# Patient Record
Sex: MALE | Race: WHITE | NOT HISPANIC OR LATINO | Employment: FULL TIME | URBAN - METROPOLITAN AREA
[De-identification: names, ages, dates, MRNs, and addresses within clinical notes are randomized per-mention and may not be internally consistent; named-entity substitution may affect disease eponyms.]

---

## 2023-12-13 ENCOUNTER — TELEPHONE (OUTPATIENT)
Dept: SPORTS MEDICINE | Facility: CLINIC | Age: 53
End: 2023-12-13
Payer: COMMERCIAL

## 2023-12-13 NOTE — TELEPHONE ENCOUNTER
Informed patient of change in provider schedule. Will need to rescehdule appointment for Friday to different date or time.

## 2023-12-15 ENCOUNTER — HOSPITAL ENCOUNTER (OUTPATIENT)
Dept: RADIOLOGY | Facility: HOSPITAL | Age: 53
Discharge: HOME | End: 2023-12-15
Attending: ORTHOPAEDIC SURGERY
Payer: COMMERCIAL

## 2023-12-15 DIAGNOSIS — M25.561 RIGHT KNEE PAIN, UNSPECIFIED CHRONICITY: ICD-10-CM

## 2023-12-15 DIAGNOSIS — M25.561 RIGHT KNEE PAIN, UNSPECIFIED CHRONICITY: Primary | ICD-10-CM

## 2023-12-15 PROCEDURE — 73564 XR KNEE ORTHO RIGHT WITH FLEXION: ICD-10-PCS | Mod: 26,RT,, | Performed by: RADIOLOGY

## 2023-12-15 PROCEDURE — 73562 X-RAY EXAM OF KNEE 3: CPT | Mod: 59,TC,LT

## 2023-12-15 PROCEDURE — 73564 X-RAY EXAM KNEE 4 OR MORE: CPT | Mod: 26,RT,, | Performed by: RADIOLOGY

## 2023-12-18 ENCOUNTER — PATIENT MESSAGE (OUTPATIENT)
Dept: SPORTS MEDICINE | Facility: CLINIC | Age: 53
End: 2023-12-18
Payer: COMMERCIAL

## 2023-12-21 ENCOUNTER — OFFICE VISIT (OUTPATIENT)
Dept: SPORTS MEDICINE | Facility: CLINIC | Age: 53
End: 2023-12-21
Payer: COMMERCIAL

## 2023-12-21 VITALS — BODY MASS INDEX: 25.73 KG/M2 | HEIGHT: 72 IN | WEIGHT: 190 LBS

## 2023-12-21 DIAGNOSIS — M76.31 IT BAND SYNDROME, RIGHT: Primary | ICD-10-CM

## 2023-12-21 PROCEDURE — 99999 PR PBB SHADOW E&M-EST. PATIENT-LVL III: CPT | Mod: PBBFAC,,, | Performed by: ORTHOPAEDIC SURGERY

## 2023-12-21 PROCEDURE — 1159F PR MEDICATION LIST DOCUMENTED IN MEDICAL RECORD: ICD-10-PCS | Mod: CPTII,S$GLB,, | Performed by: ORTHOPAEDIC SURGERY

## 2023-12-21 PROCEDURE — 3008F BODY MASS INDEX DOCD: CPT | Mod: CPTII,S$GLB,, | Performed by: ORTHOPAEDIC SURGERY

## 2023-12-21 PROCEDURE — 99204 PR OFFICE/OUTPT VISIT, NEW, LEVL IV, 45-59 MIN: ICD-10-PCS | Mod: 25,S$GLB,, | Performed by: ORTHOPAEDIC SURGERY

## 2023-12-21 PROCEDURE — 20610 LARGE JOINT ASPIRATION/INJECTION: R KNEE: ICD-10-PCS | Mod: RT,S$GLB,, | Performed by: ORTHOPAEDIC SURGERY

## 2023-12-21 PROCEDURE — 99999 PR PBB SHADOW E&M-EST. PATIENT-LVL III: ICD-10-PCS | Mod: PBBFAC,,, | Performed by: ORTHOPAEDIC SURGERY

## 2023-12-21 PROCEDURE — 3044F PR MOST RECENT HEMOGLOBIN A1C LEVEL <7.0%: ICD-10-PCS | Mod: CPTII,S$GLB,, | Performed by: ORTHOPAEDIC SURGERY

## 2023-12-21 PROCEDURE — 3044F HG A1C LEVEL LT 7.0%: CPT | Mod: CPTII,S$GLB,, | Performed by: ORTHOPAEDIC SURGERY

## 2023-12-21 PROCEDURE — 1159F MED LIST DOCD IN RCRD: CPT | Mod: CPTII,S$GLB,, | Performed by: ORTHOPAEDIC SURGERY

## 2023-12-21 PROCEDURE — 3008F PR BODY MASS INDEX (BMI) DOCUMENTED: ICD-10-PCS | Mod: CPTII,S$GLB,, | Performed by: ORTHOPAEDIC SURGERY

## 2023-12-21 PROCEDURE — 99204 OFFICE O/P NEW MOD 45 MIN: CPT | Mod: 25,S$GLB,, | Performed by: ORTHOPAEDIC SURGERY

## 2023-12-21 PROCEDURE — 20610 DRAIN/INJ JOINT/BURSA W/O US: CPT | Mod: RT,S$GLB,, | Performed by: ORTHOPAEDIC SURGERY

## 2023-12-21 RX ORDER — METHYLNALTREXONE BROMIDE 150 MG/1
450 TABLET ORAL DAILY
COMMUNITY

## 2023-12-21 RX ORDER — HYDROCODONE BITARTRATE AND ACETAMINOPHEN 10; 325 MG/1; MG/1
1 TABLET ORAL EVERY 8 HOURS PRN
COMMUNITY

## 2023-12-21 RX ORDER — THYROID 15 MG/1
45 TABLET ORAL
COMMUNITY

## 2023-12-21 RX ORDER — BUPRENORPHINE HYDROCHLORIDE 450 UG/1
450 FILM, SOLUBLE BUCCAL 2 TIMES DAILY
COMMUNITY

## 2023-12-21 RX ORDER — ESZOPICLONE 1 MG/1
3 TABLET, FILM COATED ORAL NIGHTLY
COMMUNITY

## 2023-12-21 RX ORDER — LEVOCETIRIZINE DIHYDROCHLORIDE 5 MG/1
5 TABLET, FILM COATED ORAL NIGHTLY
COMMUNITY

## 2023-12-21 RX ORDER — FAMOTIDINE 40 MG/1
40 TABLET, FILM COATED ORAL DAILY
COMMUNITY

## 2023-12-21 RX ORDER — METHYLPREDNISOLONE ACETATE 80 MG/ML
80 INJECTION, SUSPENSION INTRA-ARTICULAR; INTRALESIONAL; INTRAMUSCULAR; SOFT TISSUE
Status: DISCONTINUED | OUTPATIENT
Start: 2023-12-21 | End: 2023-12-21 | Stop reason: HOSPADM

## 2023-12-21 RX ORDER — MELOXICAM 7.5 MG/1
7.5 TABLET ORAL DAILY
Qty: 30 TABLET | Refills: 2 | Status: SHIPPED | OUTPATIENT
Start: 2023-12-21

## 2023-12-21 RX ADMIN — METHYLPREDNISOLONE ACETATE 80 MG: 80 INJECTION, SUSPENSION INTRA-ARTICULAR; INTRALESIONAL; INTRAMUSCULAR; SOFT TISSUE at 09:12

## 2023-12-21 NOTE — PATIENT INSTRUCTIONS
Assessment:  Óscar Ferminalistair is a 53 y.o. 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    It band syndrome, right Yes      Plan:  Physical therapy ordered at [facility] - 2-3 visits per week for 6-8 weeks.  Right knee corticosteroid injection today   Meloxicam 7.5 mg as needed for pain was prescribed today.      Follow-up: As Need or sooner if there are any problems between now and then.    Leave Review:   Google: Leave Google Review  Healthgrades: Leave Healthgrades Review    After Hours Number: [phone]

## 2023-12-21 NOTE — PROCEDURES
Large Joint Aspiration/Injection: R knee    Date/Time: 12/21/2023 9:15 AM    Performed by: Iker Self MD  Authorized by: Iker Self MD    Consent Done?:  Yes (Verbal)  Indications:  Joint swelling and pain  Site marked: the procedure site was marked    Timeout: prior to procedure the correct patient, procedure, and site was verified    Prep: patient was prepped and draped in usual sterile fashion      Local anesthesia used?: Yes    Anesthesia:  Local infiltration  Local anesthetic:  Bupivacaine 0.5% without epinephrine, lidocaine 1% without epinephrine, topical anesthetic and lidocaine spray    Details:  Needle Size:  22 G  Approach:  Superior  Location:  Knee  Site:  R knee  Medications:  80 mg methylPREDNISolone acetate 80 mg/mL  Patient tolerance:  Patient tolerated the procedure well with no immediate complications     2cc 1% lidocaine plain, 2cc 0.5% marcaine plain, 0.5cc 80mg methylprednisolone    Procedure Note:  We discussed the risk and benefits of injections, including pain, infection, bleeding, damage to adjacent structures, risk of reaction to injection. We discussed the steroid/cortisone injections will not heal the problem but mat help decrease inflammation and help with symptoms. We discussed the risk of repeated injections. The patient expressed understanding and wanted to proceed with the injection. We performed a timeout to verify the proper patient, proper procedure, and the proper site. The injection site was prepared in a sterile fashion. The patient tolerated it well and there were no complication. We did discuss with the patient that steroid injections can cause some increase in blood sugar and blood pressure for up to a week after the injection.

## 2023-12-21 NOTE — PROGRESS NOTES
Patient ID: Óscar Thornton  YOB: 1970  MRN: [id number]    Chief Complaint: Pain of the Right Knee    Referred By: [facility]    History of Present Illness: Óscar Thornton is a 53 y.o. 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  He states that it started 2 years ago when he felt a pop with walking squats at PT.  All of his pain is located in his lateral knee with periodic posterior knee pain.   He has done PT before with with Brianne Lao/Gracie Gates at [facility], but it was for his back.  He does report that the pain is a burning pain and it sometimes will catch if he turns his knee just right.  He has not tried any treatment for his knee and he is full weight bearing without assistive devices or bracing.    HPI    Past Medical History:   Past Medical History:   Diagnosis Date    Hyperparathyroidism, unspecified     Osteopenia      Past Surgical History:   Procedure Laterality Date    ANTERIOR CERVICAL DISCECTOMY W/ FUSION N/A 2012    ORIF FIBULA FRACTURE Right 2002    PARATHYROIDECTOMY N/A 2020     History reviewed. No pertinent family history.  Social History     Socioeconomic History    Marital status:    Tobacco Use    Smoking status: Never    Smokeless tobacco: Never     Medication List with Changes/Refills   New Medications    MELOXICAM (MOBIC) 7.5 MG TABLET    Take 1 tablet (7.5 mg total) by mouth once daily.   Current Medications    BUPRENORPHINE HCL (BELBUCA) 450 MCG FILM    Place 450 mcg inside cheek 2 (two) times daily.    ESZOPICLONE (LUNESTA) 1 MG TAB    Take 3 mg by mouth nightly.    FAMOTIDINE (PEPCID) 40 MG TABLET    Take 40 mg by mouth once daily.    HYDROCODONE-ACETAMINOPHEN (NORCO)  MG PER TABLET    Take 1 tablet by mouth every 8 (eight) hours as needed for Pain.    LEVOCETIRIZINE (XYZAL) 5 MG TABLET    Take 5 mg by mouth every evening.    METHYLNALTREXONE  (RELISTOR) 150 MG TAB    Take 450 mg by mouth once daily.    THYROID, PORK, (ARMOUR THYROID) 15 MG TAB    Take 45 mg by mouth before breakfast.     Review of patient's allergies indicates:  No Known Allergies  ROS    Physical Exam:   Body mass index is 25.77 kg/m².  There were no vitals filed for this visit.   GENERAL: Well appearing, appropriate for stated age, no acute distress.  CARDIOVASCULAR: Pulses regular by peripheral palpation.  PULMONARY: Respirations are even and non-labored.  NEURO: Awake, alert, and oriented x 3.  PSYCH: Mood & affect are appropriate.  HEENT: Head is normocephalic and atraumatic.  Ortho/SPM Exam      Right Knee:    Inspection: Normal  Palpation tenderness: Mid to Distal IT Band, Gerdy's Tubercle, posterior knee, patellar crepitus  Range of motion: 0-125 degrees.  Tightness with hip extension  Strength:  5/5 Extension    5/5 Flexion    5/5 Hip Abduction  Stability: stable ACL/Lachman      stable Posterior Drawer      stable MCL/Valgus Stress      stable LCL/Varus Stress      Positive Homer's - 12 cm  Meniscus Exam: Negative medial Flaco's         Negative lateral Flaco's  Patella Exam: Negative J-sign   Negative Patellar apprehension   Negative Patellar grind  N/V Exam:  Tibial:    Normal sensory (plantar foot)  Normal motor (FHL)    Sup Peroneal:   Normal sensory (dorsal foot)  Normal motor (Peroneals)            Deep Peroneal:   Normal sensory (1st web space)  Normal motor (EHL)    Sural:   Normal sensory (lateral foot)   Saphenous:   Normal sensory (medial lower leg)   Normal pedal pulses, warm and well perfused with capillary refill < 2 sec   Patella tendon reflex normal  Achilles tendon reflex normal      Imaging:    X-ray Knee Ortho Right with Flexion  Narrative: EXAM: XR KNEE ORTHO RIGHT WITH FLEXION    CLINICAL HISTORY:  Pain in right knee.    TECHNIQUE: Right knee x-ray 4 views.    COMPARISON STUDY: None.    FINDINGS:  Normal right knee x-ray.  Impression:  See  above.    Finalized on: 12/15/2023 8:26 AM By:  Pelon Mcgraw MD  [facility]# [id number]      2023-12-15 08:28:[id number]    [facility]      Relevant imaging results reviewed and interpreted by me, discussed with the patient and / or family today.     Other Tests:         Patient Instructions   Assessment:  Óscar Thornton is a 53 y.o. 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    It band syndrome, right Yes      Plan:  Physical therapy ordered at [facility] - 2-3 visits per week for 6-8 weeks.  Right knee corticosteroid injection today   Meloxicam 7.5 mg as needed for pain was prescribed today.      Follow-up: As Need or sooner if there are any problems between now and then.    Leave Review:   Google: Leave Google Review  Healthgrades: Leave Healthgrades Review    After Hours Number: [phone]       Provider Note/Medical Decision Making:       I discussed worrisome and red flag signs and symptoms with the patient. The patient expressed understanding and agreed to alert me immediately or to go to the emergency room if they experience any of these.   Treatment plan was developed with input from the patient/family, and they expressed understanding and agreement with the plan. All questions were answered today.          Iker Self MD  Orthopaedic Surgery & Sports Medicine       Disclaimer: This note was prepared using a voice recognition system and is likely to have sound alike errors within the text.     I, Radha Dodd, acted as a scribe for Iker Self MD for the duration of this office visit.

## 2025-01-26 ENCOUNTER — HOSPITAL ENCOUNTER (OUTPATIENT)
Dept: RADIOLOGY | Facility: HOSPITAL | Age: 55
Discharge: HOME | End: 2025-01-26
Attending: STUDENT IN AN ORGANIZED HEALTH CARE EDUCATION/TRAINING PROGRAM
Payer: COMMERCIAL

## 2025-01-26 ENCOUNTER — OFFICE VISIT (OUTPATIENT)
Facility: CLINIC | Age: 55
End: 2025-01-26
Payer: COMMERCIAL

## 2025-01-26 DIAGNOSIS — M76.899 PES ANSERINUS TENDINITIS, UNSPECIFIED LATERALITY: Primary | ICD-10-CM

## 2025-01-26 DIAGNOSIS — M25.561 RIGHT KNEE PAIN, UNSPECIFIED CHRONICITY: Primary | ICD-10-CM

## 2025-01-26 DIAGNOSIS — M25.561 RIGHT KNEE PAIN, UNSPECIFIED CHRONICITY: ICD-10-CM

## 2025-01-26 PROCEDURE — 99999 PR PBB SHADOW E&M-EST. PATIENT-LVL III: CPT | Mod: PBBFAC,,,

## 2025-01-26 PROCEDURE — 99214 OFFICE O/P EST MOD 30 MIN: CPT | Mod: S$GLB,,, | Performed by: STUDENT IN AN ORGANIZED HEALTH CARE EDUCATION/TRAINING PROGRAM

## 2025-01-26 PROCEDURE — 1159F MED LIST DOCD IN RCRD: CPT | Mod: CPTII,S$GLB,, | Performed by: STUDENT IN AN ORGANIZED HEALTH CARE EDUCATION/TRAINING PROGRAM

## 2025-01-26 PROCEDURE — 1160F RVW MEDS BY RX/DR IN RCRD: CPT | Mod: CPTII,S$GLB,, | Performed by: STUDENT IN AN ORGANIZED HEALTH CARE EDUCATION/TRAINING PROGRAM

## 2025-01-26 PROCEDURE — 97110 THERAPEUTIC EXERCISES: CPT | Mod: S$GLB,,, | Performed by: STUDENT IN AN ORGANIZED HEALTH CARE EDUCATION/TRAINING PROGRAM

## 2025-01-26 PROCEDURE — 73560 X-RAY EXAM OF KNEE 1 OR 2: CPT | Mod: 26,RT,, | Performed by: RADIOLOGY

## 2025-01-26 PROCEDURE — 73562 X-RAY EXAM OF KNEE 3: CPT | Mod: TC,PN,LT

## 2025-01-26 RX ORDER — MELOXICAM 7.5 MG/1
7.5 TABLET ORAL DAILY
Qty: 30 TABLET | Refills: 0 | Status: SHIPPED | OUTPATIENT
Start: 2025-01-26

## 2025-01-26 RX ORDER — CLOBETASOL PROPIONATE 0.5 MG/G
CREAM TOPICAL 2 TIMES DAILY
COMMUNITY

## 2025-01-26 RX ORDER — GABAPENTIN 600 MG/1
1 TABLET ORAL 3 TIMES DAILY
COMMUNITY
Start: 2023-11-09

## 2025-01-26 RX ORDER — FLUTICASONE PROPIONATE 50 MCG
2 SPRAY, SUSPENSION (ML) NASAL DAILY
COMMUNITY

## 2025-01-26 NOTE — PROGRESS NOTES
Hand Surgery Clinic Note    Chief Complaint  Chief Complaint   Patient presents with    Right Knee - Pain     Onset 01/25/2025       History of Present Illness  54 year old male presents for evaluation of right knee pain. He has previously been seen and treated by Dr. Self for right IT band syndrome. He was last seen in clinic on 12/21/23. He was treated with physical therapy, meloxicam, and a steroid injection. He still has some residual issues caused by the IT band syndrome and experiences intermittent snapping.    He is here today to be evaluated for a new issue with this knee. Yesterday, 1/25/25, he was sitting [place] style on the floor when his 12 year old son slipped and landed on the right knee. He has had pain in the medial aspect of the knee since this incident. Pain level is a 2/10. He describes the pain as sharp in nature. He did not note any swelling after the incident. He is leaving to go skiing on Thursday and wants to make sure everything is okay.    Review of Systems  Review of systems negative for chest pain, shortness of breath, fevers, chills, nausea/vomiting.    Past Medical History  Past Medical History:   Diagnosis Date    Hyperparathyroidism, unspecified     Osteopenia        Past Surgical History  Past Surgical History:   Procedure Laterality Date    ANTERIOR CERVICAL DISCECTOMY W/ FUSION N/A 2012    ORIF FIBULA FRACTURE Right 2002    PARATHYROIDECTOMY N/A 2020       Allergies  Review of patient's allergies indicates:  No Known Allergies    Family History  No family history on file.    Social History  Social History     Socioeconomic History    Marital status:    Tobacco Use    Smoking status: Never    Smokeless tobacco: Never     Social Drivers of Health     Financial Resource Strain: Low Risk  (1/22/2025)    Received from [facility] and Its Subsidiaries and Affiliates    Overall Financial Resource Strain (CARDIA)     Difficulty of Paying  Living Expenses: Not hard at all   Food Insecurity: No Food Insecurity (1/22/2025)    [facility] and Its Subsidiaries and Affiliates    Hunger Vital Sign     Worried About Running Out of Food in the Last Year: Never true     Ran Out of Food in the Last Year: Never true   Transportation Needs: No Transportation Needs (1/22/2025)    [facility]    PRAPARE - Transportation     Lack of Transportation (Medical): No     Lack of Transportation (Non-Medical): No   Physical Activity: Insufficiently Active (1/22/2025)    [facility]    Exercise Vital Sign     Days of Exercise per Week: 3 days     Minutes of Exercise per Session: 30 min   Stress: No Stress Concern Present (1/22/2025)    Received from [facility] and Its Subsidiaries and Affiliates    [place] [employer] [employer] - Occupational Stress Questionnaire     Feeling of Stress : Not at all   Housing Stability: Low Risk  (1/22/2025)    [facility]    Housing Stability Vital Sign     Unable to Pay for Housing in the Last Year: No     Number of Times Moved in the Last Year: 0     Homeless in the Last Year: No       Vital Signs  There were no vitals filed for this visit.    Physical Exam  Constitutional: Appears well-developed and well-nourished. No distress.   HENT:   Head: Normocephalic.   Eyes: EOM are normal.   Pulmonary/Chest: Effort normal.   Neurological: Oriented to person, place, and time.   Psychiatric: Normal mood and affect.     Right Lower Extremity:  No abrasions, lacerations, wounds. No swelling. No effusion. No ecchymosis. No drainage. Full active knee flexion and extension with minimal pain. No  medial or lateral joint line tenderness. He has some tenderness at the IT band insertion which is chronic. No instability with lachman or posterior drawer. The knee is stable to varus and valgus stress with no associated pain. Patient has tenderness over the pes anserine. No tenderness over the patellar tendon. No patellar instability. No tenderness over the MPFL. Sensation intact SP/DP/Sa/Trivedi/T. Palpable DP pulse.    Imaging  Right knee xrays 5 views with flexion were obtained today and independently reviewed by myself. No fracture. No dislocation or subluxation. No arthritic changes noted.    Assessment and Plan  54 year old male with a history of right IT band syndrome presents today with right pes tendinitis symptoms which started yesterday when his son landed on his knee. I discussed treatment options. I recommended starting with meloxicam medication; discussed this medication should be taken with food. Script was sent to patients pharmacy. Additionally, I provided patient with knee exercises to be performed daily. At least 15 minutes were spent developing, teaching, and performing a home exercise program.  A written summary was provided and all questions were answered. This service was performed under the direction of Dr. Nora Serna. CPT 45960-NF. Patient was scheduled to follow up with Dr. Self this week for further evaluation and treatment.      Nora Serna MD  Orthopaedic Hand Surgery

## 2025-01-29 ENCOUNTER — OFFICE VISIT (OUTPATIENT)
Dept: SPORTS MEDICINE | Facility: CLINIC | Age: 55
End: 2025-01-29
Payer: COMMERCIAL

## 2025-01-29 VITALS — BODY MASS INDEX: 25.74 KG/M2 | WEIGHT: 190.06 LBS | HEIGHT: 72 IN

## 2025-01-29 DIAGNOSIS — S83.241A TEAR OF MEDIAL MENISCUS OF RIGHT KNEE, CURRENT, UNSPECIFIED TEAR TYPE, INITIAL ENCOUNTER: ICD-10-CM

## 2025-01-29 DIAGNOSIS — S83.411A SPRAIN OF MEDIAL COLLATERAL LIGAMENT OF RIGHT KNEE, INITIAL ENCOUNTER: ICD-10-CM

## 2025-01-29 DIAGNOSIS — M76.31 IT BAND SYNDROME, RIGHT: Primary | ICD-10-CM

## 2025-01-29 DIAGNOSIS — M25.561 ACUTE PAIN OF RIGHT KNEE: ICD-10-CM

## 2025-01-29 PROCEDURE — 20610 DRAIN/INJ JOINT/BURSA W/O US: CPT | Mod: RT,S$GLB,, | Performed by: ORTHOPAEDIC SURGERY

## 2025-01-29 PROCEDURE — 1159F MED LIST DOCD IN RCRD: CPT | Mod: CPTII,S$GLB,, | Performed by: ORTHOPAEDIC SURGERY

## 2025-01-29 PROCEDURE — 97760 ORTHOTIC MGMT&TRAING 1ST ENC: CPT | Mod: GP,S$GLB,, | Performed by: ORTHOPAEDIC SURGERY

## 2025-01-29 PROCEDURE — 99999 PR PBB SHADOW E&M-EST. PATIENT-LVL IV: CPT | Mod: PBBFAC,,, | Performed by: ORTHOPAEDIC SURGERY

## 2025-01-29 PROCEDURE — 97110 THERAPEUTIC EXERCISES: CPT | Mod: GP,59,S$GLB, | Performed by: ORTHOPAEDIC SURGERY

## 2025-01-29 PROCEDURE — 3008F BODY MASS INDEX DOCD: CPT | Mod: CPTII,S$GLB,, | Performed by: ORTHOPAEDIC SURGERY

## 2025-01-29 PROCEDURE — 99214 OFFICE O/P EST MOD 30 MIN: CPT | Mod: 25,S$GLB,, | Performed by: ORTHOPAEDIC SURGERY

## 2025-01-29 RX ADMIN — METHYLPREDNISOLONE ACETATE 80 MG: 80 INJECTION, SUSPENSION INTRA-ARTICULAR; INTRALESIONAL; INTRAMUSCULAR; SOFT TISSUE at 10:01

## 2025-01-29 NOTE — PATIENT INSTRUCTIONS
Assessment:  Óscar Thornton is a  54 y.o. 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    It band syndrome, right Yes        Plan:  Continue Meloxicam 7.5mg once a day as needed for pain. Already prescribed.   Right knee CSI 2/2/40 for symptomatic relief.  Gave appropriate warnings.  Monitor blood pressure.  Blood pressure can be affected as well as blood sugar.  Home exercise program given to the patient today. Patient is leaving for skiing trip today. Advised patient to start these exercises in a few days to work on quad and hip strength.   Fitted for hinge knee sleeve to use while skiing.   Discuss possible MRI if no improvement     Follow-up: As needed. Please reach out to us sooner if there are any problems between now and then.    About Dr. Clair Self's Research & Publications    Give us Feedback:   Google: Leave Google Review  Healthgrades: Leave Healthgrades Review    After Hours Number: [phone]                                                If you have any difficulties reading this information, you may visit the online version using the following link: IT Band Syndrome  ([email or web address])

## 2025-01-29 NOTE — PROGRESS NOTES
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 Initially, he had sharp, shooting pain when attempting to bend his knee, but it has improved since then. He could not bend his knee without experiencing sharp shooting pain, but now he can do it with only soreness. He has been taking Mobic, prescribed by Dr. Benavides, which seems to be helping.    He reports a lingering issue on the right side of his knee, associated with a previous physical therapy incident approximately three years ago. He felt a "pop" during PT, and states that it has not been the same since. This area, which he identifies as the IT band insertion point, becomes aggravated when he attempts to work out, particularly with exercises like squats and lunges. He also mentions discomfort behind the knee.    The IT band pain is especially noticeable when his leg is fully straightened. When standing at his desk and locking his legs, his whole leg will ache. Pain subsides when he stops working out but returns with activity. He denies feeling a pop when his daughter fell on his knee during the recent incident.    Óscar denies any formal medical diagnoses. Physical therapy for IT band issue in the past, which resulted in a pop on the lateral side of the knee. The knee has not been the same since.    MEDICATIONS:  - Mobic (meloxicam): Óscar reports it seems to be working and helping the knee get better.    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      ROS:  Musculoskeletal: +joint pain       Recall from previous HPI on 12/15/23:Óscar presents to the clinic with 2/10 right knee pain.  He states that it started 2 years ago when he felt a pop with walking squats at PT.  All of his pain is located in his lateral knee with periodic posterior knee pain.   He has done PT before with with Brianne Lao/Gracie Gates at [facility], but it was for his back.  He does report that the pain is a burning pain and it sometimes will catch if he turns his knee just right.  He has not tried any treatment for his knee and he is full weight bearing without assistive devices or bracing.      Past Medical History:   Past Medical History:   Diagnosis Date    Hyperparathyroidism, unspecified     Osteopenia      Past Surgical History:   Procedure Laterality Date    ANTERIOR CERVICAL DISCECTOMY W/ FUSION N/A 2012    ORIF FIBULA FRACTURE Right 2002    PARATHYROIDECTOMY N/A 2020     No family history on file.  Social History     Socioeconomic History    Marital status:    Tobacco Use    Smoking status: Never    Smokeless tobacco: Never     Social Drivers of Health     Financial Resource Strain: Low Risk  (1/22/2025)    Received from [facility] Subsidiaries and Affiliates    Overall Financial Resource Strain (CARDIA)     Difficulty of Paying Living Expenses: Not hard at all   Food Insecurity: No Food Insecurity (1/22/2025)    Received from [facility] Subsidiaries and Affiliates    Hunger Vital Sign     Worried About Running Out of Food in the Last Year: Never true     Ran Out of Food in the Last Year: Never true   Transportation Needs: No Transportation Needs (1/22/2025)    Received from [facility] Subsidiaries and Affiliates    PRAPARE - Transportation     Lack of Transportation (Medical): No     Lack of Transportation (Non-Medical): No    Physical Activity: Insufficiently Active (1/22/2025)    Received from [facility] and [facility] Subsidiaries and Affiliates    Exercise Vital Sign     Days of Exercise per Week: 3 days     Minutes of Exercise per Session: 30 min   Stress: No Stress Concern Present (1/22/2025)    Received from [facility] and Its Subsidiaries and Affiliates    [employer] - Occupational Stress Questionnaire     Feeling of Stress : Not at all   Housing Stability: Low Risk  (1/22/2025)    [facility]    Housing Stability Vital Sign     Unable to Pay for Housing in the Last Year: No     Number of Times Moved in the Last Year: 0     Homeless in the Last Year: No     Medication List with Changes/Refills   Current Medications    BUPRENORPHINE HCL (BELBUCA) 450 MCG FILM    Place 450 mcg inside cheek 2 (two) times daily.    CALCIUM-VITAMIN D 250 MG-2.5 MCG (100 UNIT) PER TABLET    Take 1 tablet by mouth.    CLOBETASOL (TEMOVATE) 0.05 % CREAM    Apply topically 2 (two) times daily.    ESZOPICLONE (LUNESTA) 1 MG TAB    Take 3 mg by mouth nightly.    FAMOTIDINE (PEPCID) 40 MG TABLET    Take 40 mg by mouth once daily.    FLUTICASONE PROPIONATE (FLONASE) 50 MCG/ACTUATION NASAL SPRAY    2 sprays by Each Nostril route once daily.    GABAPENTIN (NEURONTIN) 600 MG TABLET    Take 1 tablet by mouth 3 (three) times daily.    HYDROCODONE-ACETAMINOPHEN (NORCO)  MG PER TABLET    Take 1 tablet by mouth every 8 (eight) hours as needed for Pain.    LEVOCETIRIZINE (XYZAL) 5 MG TABLET    Take 5 mg by mouth every evening.    MELOXICAM (MOBIC) 7.5 MG TABLET    Take 1 tablet (7.5 mg total) by mouth once daily.    MELOXICAM (MOBIC) 7.5 MG TABLET    Take 1 tablet (7.5 mg total) by mouth once daily.    METHYLNALTREXONE (RELISTOR) 150 MG TAB    Take 450 mg by mouth once daily.     THYROID, PORK, (ARMOUR THYROID) 15 MG TAB    Take 45 mg by mouth before breakfast.     Review of patient's allergies indicates:  No Known Allergies  ROS    Physical Exam:   Body mass index is 25.77 kg/m².  There were no vitals filed for this visit.   GENERAL: Well appearing, appropriate for stated age, no acute distress.  CARDIOVASCULAR: Pulses regular by peripheral palpation.  PULMONARY: Respirations are even and non-labored.  NEURO: Awake, alert, and oriented x 3.  PSYCH: Mood & affect are appropriate.  HEENT: Head is normocephalic and atraumatic.            Right Knee Exam     Inspection   Effusion: absent    Tenderness   The patient is tender to palpation of the medial joint line and iliotibial band.    Range of Motion   Extension:  0   Flexion:  120     Tests   Meniscus   Flaco:  Medial - negative Lateral - negative  Ligament Examination   Lachman: normal (-1 to 2mm)   MCL - Valgus: normal (0 to 2mm)  LCL - Varus: normal    Other   Sensation: normal    Comments:  Intact EHL, FHL, gastrocsoleus, and tibialis anterior. Sensation intact to light touch in superficial peroneal, deep peroneal, tibial, sural, and saphenous nerve distributions. Foot warm and well perfused with capillary refill of less than 2 seconds and palpable pedal pulses.      Muscle Strength   Right Lower Extremity   Hip Abduction: 5/5   Quadriceps:  5/5   Hamstrin/5     Vascular Exam     Right Pulses  Dorsalis Pedis:      2+  Posterior Tibial:      2+        Physical Exam                  Imaging:    X-ray Knee Ortho Right with Flexion  Narrative: EXAM: XR KNEE ORTHO RIGHT WITH FLEXION    CLINICAL HISTORY: Pain    FINDINGS:  Compared to 12/15/2023.    No fracture, dislocation or joint effusion is identified.  Joint spaces are maintained.  Impression:  No evidence of acute injury or significant degenerative changes.    Finalized on: 2025 9:43 AM By:  Bishop GREWAL# [id number]      2025 09:[id number]     [facility]        Relevant imaging results reviewed and interpreted by me, discussed with the patient and / or family today.     Other Tests:       Assessment & Plan    PLAN SUMMARY:   Continue IT band stretching and rolling   Perform quad and glute strengthening exercises   Continue taking Mobic   Low-dose knee injection recommended   Use hinge knee sleeve for skiing   PT session for IT band stretching, rolling, and quad activation exercises (15-20 minutes)   Avoid stretching exercises that bend the ankle back    RIGHT KNEE PAIN AND ILIOTIBIAL BAND SYNDROME:   Low-dose knee injection recommended.   Explained injection may initially increase irritation but should provide relief for the upcoming ski trip.   Instructed Laney to take the patient to PT gym for 15-20 minutes of IT band stretching, rolling, and quad activation exercises.   Use hinge knee sleeve for skiing to provide stability.   Continue IT band stretching and rolling.   Perform quad and glute strengthening exercises.   Listen to body and be careful while skiing.    GENERAL MANAGEMENT:   Avoid stretching exercises that bend the ankle back.   Continue taking Mobic.             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    It band syndrome, right Yes        Plan:  Continue Meloxicam 7.5mg once a day as needed for pain. Already prescribed.   Right knee CSI 2/2/40 for symptomatic relief.  Gave appropriate warnings.  Monitor blood pressure.  Blood pressure can be affected as well as blood sugar.  Home exercise program given to the patient today. Patient is leaving for skiing trip today. Advised patient to start these exercises in a few days to work on quad and hip strength.   Fitted for hinge knee sleeve to use while  skiing.   Discuss possible MRI if no improvement     Follow-up: As needed. Please reach out to us sooner if there are any problems between now and then.    About Dr. Clair Self's Research & Publications    Give us Feedback:   Google: Leave Google Review  Healthgrades: Leave Healthgrades Review    After Hours Number: [phone]                                                If you have any difficulties reading this information, you may visit the online version using the following link: IT Band Syndrome  ([email or web address])      Provider Note/Medical Decision Making: 10 minutes were spent sizing, fitting, and educating regarding durable medical equipment by Dr. Iker Self and his assistant under his direction today.  CPT 52204. At least 15 minutes were spent developing, teaching, and performing a home exercise program.  A written summary was provided and all questions were answered. This service was performed by Dr. Iker Self and his assistant under his direction. CPT 11846-DE       I discussed worrisome and red flag signs and symptoms with the patient. The patient expressed understanding and agreed to alert me immediately or to go to the emergency room if they experience any of these.   Treatment plan was developed with input from the patient/family, and they expressed understanding and agreement with the plan. All questions were answered today.          Iker Self MD  Orthopaedic Surgery & Sports Medicine       Disclaimer: This note was prepared using a voice recognition system and is likely to have sound alike errors within the text.     This note was generated with the assistance of ambient listening technology. Verbal consent was obtained by the patient and accompanying visitor(s) for the recording of patient appointment to facilitate this note. I attest to having reviewed and edited the generated note for  accuracy, though some syntax or spelling errors may persist. Please contact the author of this note for any clarification.    I, Laney Diaz, acted as a scribe for Iker Self MD for the duration of this office visit.

## 2025-01-31 RX ORDER — METHYLPREDNISOLONE ACETATE 80 MG/ML
80 INJECTION, SUSPENSION INTRA-ARTICULAR; INTRALESIONAL; INTRAMUSCULAR; SOFT TISSUE
Status: DISCONTINUED | OUTPATIENT
Start: 2025-01-29 | End: 2025-01-31 | Stop reason: HOSPADM

## 2025-01-31 NOTE — PROCEDURES
Large Joint Aspiration/Injection: R knee    Date/Time: 1/29/2025 10:00 AM    Performed by: Iker Self MD  Authorized by: Iker Self MD    Consent Done?:  Yes (Verbal)  Indications:  Joint swelling and pain  Site marked: the procedure site was marked    Timeout: prior to procedure the correct patient, procedure, and site was verified    Prep: patient was prepped and draped in usual sterile fashion      Local anesthesia used?: Yes    Anesthesia:  Local infiltration  Local anesthetic:  Bupivacaine 0.5% without epinephrine, lidocaine 1% without epinephrine, topical anesthetic and lidocaine spray    Details:  Needle Size:  22 G  Approach:  Superior  Location:  Knee  Site:  R knee  Medications:  80 mg methylPREDNISolone acetate 80 mg/mL  Patient tolerance:  Patient tolerated the procedure well with no immediate complications     2cc 1% lidocaine plain, 2cc 0.5% marcaine plain, 0.5cc 80mg methylprednisolone    Procedure Note:  We discussed the risk and benefits of injections, including pain, infection, bleeding, damage to adjacent structures, risk of reaction to injection. We discussed the steroid/cortisone injections will not heal the problem but mat help decrease inflammation and help with symptoms. We discussed the risk of repeated injections. The patient expressed understanding and wanted to proceed with the injection. We performed a timeout to verify the proper patient, proper procedure, and the proper site. The injection site was prepared in a sterile fashion. The patient tolerated it well and there were no complication. We did discuss with the patient that steroid injections can cause some increase in blood sugar and blood pressure for up to a week after the injection.